# Patient Record
Sex: MALE | Race: BLACK OR AFRICAN AMERICAN | Employment: FULL TIME | URBAN - METROPOLITAN AREA
[De-identification: names, ages, dates, MRNs, and addresses within clinical notes are randomized per-mention and may not be internally consistent; named-entity substitution may affect disease eponyms.]

---

## 2022-02-03 ENCOUNTER — APPOINTMENT (OUTPATIENT)
Dept: GENERAL RADIOLOGY | Age: 22
End: 2022-02-03
Attending: PHYSICIAN ASSISTANT
Payer: OTHER GOVERNMENT

## 2022-02-03 ENCOUNTER — HOSPITAL ENCOUNTER (EMERGENCY)
Age: 22
Discharge: HOME | End: 2022-02-03
Attending: EMERGENCY MEDICINE
Payer: OTHER GOVERNMENT

## 2022-02-03 VITALS
HEART RATE: 60 BPM | SYSTOLIC BLOOD PRESSURE: 154 MMHG | BODY MASS INDEX: 24.34 KG/M2 | OXYGEN SATURATION: 99 % | RESPIRATION RATE: 17 BRPM | DIASTOLIC BLOOD PRESSURE: 82 MMHG | WEIGHT: 170 LBS | TEMPERATURE: 98.8 F | HEIGHT: 70 IN

## 2022-02-03 DIAGNOSIS — S60.031A CONTUSION OF RIGHT MIDDLE FINGER WITHOUT DAMAGE TO NAIL, INITIAL ENCOUNTER: Primary | ICD-10-CM

## 2022-02-03 PROCEDURE — 99282 EMERGENCY DEPT VISIT SF MDM: CPT

## 2022-02-03 PROCEDURE — 77030008304 HC SPLNT FNGR ALUM DERY -A

## 2022-02-03 PROCEDURE — 73130 X-RAY EXAM OF HAND: CPT

## 2022-02-03 NOTE — LETTER
[facility] [place]  THE [facility] EMERGENCY DEPT  [address]  [facility] [address] [phone]    Work/School Note    Date: 2/3/2022    To Whom It May concern:    Radha Strickland was seen and treated today in the emergency room by the following provider(s):  Attending Provider: Pam Rush MD  Physician Assistant: DAYO Patel. Radha Strickland  -please excuse from work until February 5, 2022 due to injury.     Sincerely,          DAYO Abel

## 2022-02-04 NOTE — ED PROVIDER NOTES
EMERGENCY DEPARTMENT HISTORY AND PHYSICAL EXAM    Date: 2/3/2022  Patient Name: Oskar Joshi    History of Presenting Illness     Time Seen: 10:29 PM    Chief Complaint   Patient presents with    Finger Pain       History Provided By: Patient    Additional History (Context):   Oskar Joshi is a 23 y.o. male presents to the emergency room with complaints of right middle finger pain/injury after the finger was smashed while lifting a trailer. Complains of pain and swelling to the finger. Superficial abrasion to the back of the finger. Pain experienced with any attempted movement. Remainder the hand without pain. He is right-hand dominant. PCP: Tien, MD Sully        Past History     Past Medical History:  History reviewed. No pertinent past medical history. Past Surgical History:  History reviewed. No pertinent surgical history. Family History:  History reviewed. No pertinent family history. Social History:  Social History     Tobacco Use    Smoking status: Never Smoker    Smokeless tobacco: Never Used   Substance Use Topics    Alcohol use: Not Currently    Drug use: Never       Allergies:  No Known Allergies      Review of Systems   Review of Systems   Musculoskeletal:        Hand/third finger pain   Skin: Positive for wound. Neurological: Negative for numbness. Physical Exam     Vitals:    02/03/22 2218 02/03/22 2220   BP:  (!) 154/82   Pulse: 60    Resp: 17    Temp: 98.8 °F (37.1 °C)    SpO2: 99%    Weight: 77.1 kg (170 lb)    Height: 5' 10\" (1.778 m)      Physical Exam  Vitals and nursing note reviewed. Constitutional:       General: He is not in acute distress. Appearance: Normal appearance. He is well-developed, well-groomed and normal weight. Comments: Healthy 38-year-old male no apparent distress. Vital signs are stable. Cooperative. Musculoskeletal:      Right wrist: Normal.      Right hand: Swelling, tenderness and bony tenderness present.  No deformity or lacerations. Decreased range of motion. Normal strength. Normal sensation. Normal capillary refill. Hands:       Comments: Right hand -no signs of any obvious significant trauma. There is a superficial abrasion noted to the dorsum of the right hand overlying the middle phalanx of the third finger. This area also has some soft tissue swelling. There is no bruising or deformity. Tenderness palpation over the middle and proximal phalanx as well as the PIP joint. Adequate range of motion in the finger with no real deficits. The remainder the hand nontender. Neurovascular intact distally. Skin:     General: Skin is warm and dry. Capillary Refill: Capillary refill takes less than 2 seconds. Neurological:      Mental Status: He is alert and oriented to person, place, and time. Psychiatric:         Behavior: Behavior is cooperative. Nursing note and vitals reviewed         Diagnostic Study Results     Labs -   No results found for this or any previous visit (from the past 12 hour(s)). Radiologic Studies   Wet reading on x-ray reveals no fracture or dislocation. Official reading by radiology is pending. XR HAND RT MIN 3 V    (Results Pending)     CT Results  (Last 48 hours)    None        CXR Results  (Last 48 hours)    None            Medical Decision Making   I am the first provider for this patient. I reviewed the vital signs, available nursing notes, past medical history, past surgical history, family history and social history. Vital Signs-Reviewed the patient's vital signs. Records Reviewed: Nursing Notes    DDX:  Right third finger injury -rule out fracture    Procedures:  Procedures    ED Course:   Initial assessment performed. The patients presenting problems have been discussed, and they are in agreement with the care plan formulated and outlined with them. I have encouraged them to ask questions as they arise throughout their visit.          ED Physician Discussion Note:   X-rays showed no evidence of fracture dislocation  Patient was placed in a metal finger splint for immobilization  Patient requested work note  Recommended over-the-counter ibuprofen for discomfort  Discharge    Diagnosis and Disposition       DISCHARGE NOTE:  Oswaldo Love Davidtrena's  results have been reviewed with him. He has been counseled regarding his diagnosis, treatment, and plan. He verbally conveys understanding and agreement of the signs, symptoms, diagnosis, treatment and prognosis and additionally agrees to follow up as discussed. He also agrees with the care-plan and conveys that all of his questions have been answered. I have also provided discharge instructions for him that include: educational information regarding their diagnosis and treatment, and list of reasons why they would want to return to the ED prior to their follow-up appointment, should his condition change. He has been provided with education for proper emergency department utilization. CLINICAL IMPRESSION:    1. Contusion of right middle finger without damage to nail, initial encounter        PLAN:  1. D/C Home  2. There are no discharge medications for this patient. 3.   Follow-up Information     Follow up With Specialties Details Why Contact Info    Primary care provider  Call  Call your PCP for follow-up care         ____________________________________     Please note that this dictation was completed with Fontself, the computer voice recognition software. Quite often unanticipated grammatical, syntax, homophones, and other interpretive errors are inadvertently transcribed by the computer software. Please disregard these errors. Please excuse any errors that have escaped final proofreading.

## 2022-02-04 NOTE — DISCHARGE INSTRUCTIONS
Rest, ice, elevation  Limited use of hand  Use metal finger splint as needed  Recommended use of ibuprofen for pain/inflammation  Follow-up with your PCP